# Patient Record
Sex: MALE | Race: OTHER
[De-identification: names, ages, dates, MRNs, and addresses within clinical notes are randomized per-mention and may not be internally consistent; named-entity substitution may affect disease eponyms.]

---

## 2018-03-07 ENCOUNTER — HOSPITAL ENCOUNTER (EMERGENCY)
Dept: HOSPITAL 74 - JER | Age: 48
Discharge: HOME | End: 2018-03-07
Payer: SELF-PAY

## 2018-03-07 VITALS — HEART RATE: 65 BPM | DIASTOLIC BLOOD PRESSURE: 79 MMHG | SYSTOLIC BLOOD PRESSURE: 128 MMHG

## 2018-03-07 VITALS — BODY MASS INDEX: 37.2 KG/M2

## 2018-03-07 VITALS — TEMPERATURE: 98.2 F

## 2018-03-07 DIAGNOSIS — I10: ICD-10-CM

## 2018-03-07 DIAGNOSIS — R73.9: Primary | ICD-10-CM

## 2018-03-07 LAB
ALBUMIN SERPL-MCNC: 4.5 G/DL (ref 3.4–5)
ALP SERPL-CCNC: 115 U/L (ref 45–117)
ALT SERPL-CCNC: 40 U/L (ref 12–78)
ANION GAP SERPL CALC-SCNC: 9 MMOL/L (ref 8–16)
APPEARANCE UR: CLEAR
AST SERPL-CCNC: 18 U/L (ref 15–37)
BASOPHILS # BLD: 1.1 % (ref 0–2)
BILIRUB SERPL-MCNC: 0.6 MG/DL (ref 0.2–1)
BILIRUB UR STRIP.AUTO-MCNC: NEGATIVE MG/DL
BUN SERPL-MCNC: 12 MG/DL (ref 7–18)
CALCIUM SERPL-MCNC: 9.1 MG/DL (ref 8.5–10.1)
CHLORIDE SERPL-SCNC: 99 MMOL/L (ref 98–107)
CO2 SERPL-SCNC: 26 MMOL/L (ref 21–32)
COLOR UR: (no result)
CREAT SERPL-MCNC: 1 MG/DL (ref 0.7–1.3)
DEPRECATED RDW RBC AUTO: 14.2 % (ref 11.9–15.9)
EOSINOPHIL # BLD: 15.4 % (ref 0–4.5)
GLUCOSE SERPL-MCNC: 395 MG/DL (ref 74–106)
HCT VFR BLD CALC: 44 % (ref 35.4–49)
HGB BLD-MCNC: 14.9 GM/DL (ref 11.7–16.9)
KETONES UR QL STRIP: (no result)
LEUKOCYTE ESTERASE UR QL STRIP.AUTO: NEGATIVE
LYMPHOCYTES # BLD: 26.8 % (ref 8–40)
MCH RBC QN AUTO: 28 PG (ref 25.7–33.7)
MCHC RBC AUTO-ENTMCNC: 33.7 G/DL (ref 32–35.9)
MCV RBC: 83 FL (ref 80–96)
MONOCYTES # BLD AUTO: 6.2 % (ref 3.8–10.2)
NEUTROPHILS # BLD: 50.5 % (ref 42.8–82.8)
NITRITE UR QL STRIP: NEGATIVE
PH BLDV: 7.39 [PH] (ref 7.32–7.42)
PH UR: 6 [PH] (ref 5–8)
PLATELET # BLD AUTO: 105 K/MM3 (ref 134–434)
PLATELET BLD QL SMEAR: (no result)
PMV BLD: 12.2 FL (ref 7.5–11.1)
POTASSIUM SERPLBLD-SCNC: 4.1 MMOL/L (ref 3.5–5.1)
PROT SERPL-MCNC: 8 G/DL (ref 6.4–8.2)
PROT UR QL STRIP: (no result)
PROT UR QL STRIP: NEGATIVE
RBC # BLD AUTO: 5.31 M/MM3 (ref 4–5.6)
RBC # UR STRIP: NEGATIVE /UL
SODIUM SERPL-SCNC: 134 MMOL/L (ref 136–145)
SP GR UR: 1.04 (ref 1–1.03)
UROBILINOGEN UR STRIP-MCNC: NEGATIVE MG/DL (ref 0.2–1)
VENOUS PC02: 46.5 MMHG (ref 38–52)
VENOUS PO2: 35.6 MMHG (ref 28–48)
WBC # BLD AUTO: 6.7 K/MM3 (ref 4–10)

## 2018-03-07 PROCEDURE — 3E0337Z INTRODUCTION OF ELECTROLYTIC AND WATER BALANCE SUBSTANCE INTO PERIPHERAL VEIN, PERCUTANEOUS APPROACH: ICD-10-PCS

## 2018-03-07 PROCEDURE — 3E013VG INTRODUCTION OF INSULIN INTO SUBCUTANEOUS TISSUE, PERCUTANEOUS APPROACH: ICD-10-PCS

## 2018-03-07 NOTE — PDOC
Attending Attestation





- HPI


HPI: 





03/07/18 15:45


The patient is a 47 year old male with a significant PMH of HTN who presents to 

the emergency department for evaluation of high blood sugar in the setting of 

increased thirst and urination over the past 3 days. The patient reports using 

his sisters blood sugar monitor today and noting his blood sugar was over 500, 

as he has a family history of diabetes and is aware of how it may present. The 

patient denies fevers and chills. He denies chest pain and shortness of breath. 

He denies nausea, vomiting, and diarrhea. 


 


Allergies: NKDA


PCP: Dr. Arnoldo Slade








<Rodolfo Scott - Last Filed: 03/07/18 15:45>





- Resident


Resident Name: Kurtis Arevalo





- ED Attending Attestation


I have performed the following: I have examined & evaluated the patient, The 

case was reviewed & discussed with the resident, I agree w/resident's findings 

& plan, Exceptions are as noted





- Physicial Exam


PE: 





GENERAL: Awake, alert, and fully oriented, in no acute distress


HEAD: No signs of trauma


EYES: PERRLA, EOMI, sclera anicteric, conjunctiva clear


ENT: Auricles normal inspection, hearing grossly normal, nares patent, 

oropharynx clear without exudates. Dry mucosa


NECK: Normal ROM, supple, no lymphadenopathy, JVD, or masses


LUNGS: Breath sounds equal, clear to auscultation bilaterally.  No wheezes, and 

no crackles


HEART: Regular rate and rhythm, normal S1 and S2, no murmurs, rubs or gallops


ABDOMEN: Soft, nontender, normoactive bowel sounds.  No guarding, no rebound.  

No masses


EXTREMITIES: Normal range of motion, no edema.  No clubbing or cyanosis. No 

cords, erythema, or tenderness


NEUROLOGICAL: Cranial nerves II through XII grossly intact.  Normal speech, 

normal gait


SKIN: Warm, Dry, normal turgor, no rashes or lesions noted.











- Medical Decision Making





Pt presents with new onset diabetes. Will check LFTs, treat with IV fluids and 

insulin. Once he is below FS of 300, will DC home on Metformin, with outpatient 

f/u for continued workup and treatment. 








<Yana Saba - Last Filed: 03/07/18 17:51>

## 2018-03-07 NOTE — PDOC
History of Present Illness





- General


Chief Complaint: Blood Sugar Problem


Stated Complaint: HIGH BLOOD SUGAR (500)


Time Seen by Provider: 03/07/18 14:53





- History of Present Illness


Initial Comments: 





03/07/18 15:32


The patient is a 47 year old male with a history of HTN who presents for 

evaluation of high blood sugars.  The patient reports a 2-3 day history of 

increased thirst and increased frequency of urination.  He notes that he does 

not have a history of DM, however has a strong family history of DM.  He noted 

that his sister had a glucose monitor and his measured his sugar at home noting 

it to be 513 prompting his presentation to the ED for further evaluation.  He 

otherwise denies fevers, chills, SOB, chest pain, nausea, vomiting, abdominal 

pain, or changes with bowel movements.





Past History





- Past Medical History


Allergies/Adverse Reactions: 


 Allergies











Allergy/AdvReac Type Severity Reaction Status Date / Time


 


No Known Drug Allergies Allergy   Verified 03/07/18 14:36











Home Medications: 


Ambulatory Orders





Amlodipine Besylate [Norvasc -] 10 mg PO DAILY 06/19/14 


Lisinopril [Prinivil] 20 mg PO DAILY 06/19/14 


Metformin HCl 500 mg PO BID #20 tablet 03/07/18 








Anemia: No


Asthma: No


Cancer: No


Cardiac Disorders: No


CVA: No


COPD: No


CHF: No


DVT: No


Dementia: No


Diabetes: No


GI Disorders: No


 Disorders: No


HTN: Yes


Hypercholesterolemia: No


Liver Disease: No


Seizures: No


Thyroid Disease: No





- Surgical History


Orthopedic Surgery: Yes (achilles tendon repair)





- Suicide/Smoking/Psychosocial Hx


Smoking History: Never smoked


Have you smoked in the past 12 months: No


Information on smoking cessation initiated: No


Hx Alcohol Use: Yes (occasional)


Drug/Substance Use Hx: No


Substance Use Type: None


Hx Substance Use Treatment: No





**Review of Systems





- Review of Systems


Comments:: 





03/07/18 15:40


Constitutional: Increased Thirst. No fevers, chills, fatigue, malaise


HEENT: No Rhinorrhea, nasal congestion, visual changes


Cardiovascular: No chest pain, syncope, palpitations, lightheadedness


Respiratory: No Cough, SOB, Hemoptysis,


Gastrointestinal: No Abdominal pain, Nausea, Vomiting, Constipation, Diarrhea, 

Melena


Genitourinary: Increased Frequency. No Dysuria, Urgency, Hesitancy, Hematuria, 

Flank pain


Musculoskeletal: No Myalgia, arthralgia


Skin: No rashes, itching, bruising, pallor


Neurologic: No Headache, Dizziness, Numbness, Weakness, or Tingling


Psychiatric: No Hallucinations. No SI or HI











*Physical Exam





- Vital Signs


 Last Vital Signs











Temp Pulse Resp BP Pulse Ox


 


 98.2 F   78   19   139/95   97 


 


 03/07/18 14:34  03/07/18 14:34  03/07/18 14:34  03/07/18 14:34  03/07/18 14:34














- Physical Exam


Comments: 





03/07/18 15:41


General Appearance: Nourished. No Apparent Distress


HEENT: EOMI, HALLE. No Pharyngeal Erythema, Tonsillar Exudate, Tonsillar Erythema


Neck: No Cervical Lymphadenopathy


Respiratory/Chest: Lungs Clear, Normal Breath Sounds. No Crackles, Rales, 

Rhonchi, Wheezing


Cardiovascular: Regular Rhythm, Regular Rate. No Murmur, Gallops, Rubs


Gastrointestinal/Abdominal: Normal Bowel Sounds, Soft. No Guarding, Rebound, 

Tenderness


Musculoskeletal: No CVA Tenderness


Extremity: Normal Capillary Refill


Integumentary: Normal Color, Dry, Warm


Neurologic: Fully Oriented, Alert, Normal Mood/Affect, Normal Response,





**Heart Score/ECG Review


  ** #1


ECG reviewed & interpreted by me at: 16:00


General ECG Interpretation: Sinus Rhythm, Normal Rate, Normal Intervals, No 

acute ischemic changes





ED Treatment Course





- LABORATORY


CBC & Chemistry Diagram: 


 03/07/18 15:20





 03/07/18 15:20





Medical Decision Making





- Medical Decision Making





03/07/18 15:42


The patient is a 47 year old male with a history of HTN who presents for 

evaluation of high blood sugars.  Differential includes but is not limited to: 

Hyperglycemia, HHS, DKA, infectious, metabolic derangement.  Given the patient'

s history and physical exam, it is likely his symptoms are due to DM and 

hyperglycemia.  The patient does not appear to be in DKA on exam.  However we 

will obtain a cbc, cmp, acetone, ua, ekg to evaluate further.  The patient's 

finger stick was 438 in triage.  We will treat with iv fluids and 4 units of 

insulin in the meantime and continue to monitor and reassess.





03/07/18 17:33


CBC was unremarkable.  CMP demonstrated an elevated glucose to 395 but was 

otherwise unremarkable.  Acetone was negative.  The patient's finger stick was 

312 after 2 L iv fluids and 4 units of insulin.  We will give another 1 L of iv 

fluids and 2 units of insulin and reassess.





03/07/18 18:44


Blood glucose is improved to 230.  We are comfortable discharging the patient 

home at this time with primary care provider follow up on Metformin.  We 

discussed the results and the plan with the patient as well as return 

precautions and the patient voiced understanding and is agreeable with the plan.





*DC/Admit/Observation/Transfer


Diagnosis at time of Disposition: 


 Hyperglycemia








- Discharge Dispostion


Disposition: HOME


Condition at time of disposition: Good


Admit: No





- Prescriptions


Prescriptions: 


Metformin HCl 500 mg PO BID #20 tablet





- Referrals


Referrals: 


Arnoldo Slade MD [Primary Care Provider] - 





- Patient Instructions


Printed Discharge Instructions:  DI for Diabetes Type 2, DI for Hyperglycemia -

- Adult


Additional Instructions: 


Please return to the ER if you experience concerning or worsening symptoms 

including difficulty breathing, abdominal pain, vomiting, or chest pain.





Your lab results showed a high blood sugar here in the ER.  You likely have 

Diabetes and will need to discuss with your primary care provider about further 

management of your blood sugar and possible medication regimens.  In the 

meantime, we have sent a prescription for Metformin to your pharmacy which you 

should take 500mg Twice a day until you follow up with your primary care 

provider to help prevent your blood surgery from getting dangerously high again.





It is extremely important that you call to schedule a follow up appointment 

with your primary care provider within 2-3 days to further discuss management 

of your blood sugar.





- Post Discharge Activity

## 2018-03-08 NOTE — EKG
Test Reason : 

Blood Pressure : ***/*** mmHG

Vent. Rate : 068 BPM     Atrial Rate : 068 BPM

   P-R Int : 168 ms          QRS Dur : 114 ms

    QT Int : 424 ms       P-R-T Axes : 038 011 049 degrees

   QTc Int : 450 ms

 

NORMAL SINUS RHYTHM

NORMAL ECG

WHEN COMPARED WITH ECG OF 13-SEP-2015 09:36,

NO SIGNIFICANT CHANGE WAS FOUND

Confirmed by KOBE GOOD MD ([id number]) on 3/8/2018 12:24:52 PM

 

Referred By:             Confirmed By:KOBE GOOD MD